# Patient Record
Sex: FEMALE | Race: WHITE
[De-identification: names, ages, dates, MRNs, and addresses within clinical notes are randomized per-mention and may not be internally consistent; named-entity substitution may affect disease eponyms.]

---

## 2010-03-16 LAB — MAMMOGRAPHY, EXTERNAL: NORMAL

## 2024-08-29 ENCOUNTER — OFFICE VISIT (OUTPATIENT)
Facility: CLINIC | Age: 59
End: 2024-08-29

## 2024-08-29 VITALS
BODY MASS INDEX: 40.01 KG/M2 | WEIGHT: 217.4 LBS | SYSTOLIC BLOOD PRESSURE: 181 MMHG | DIASTOLIC BLOOD PRESSURE: 86 MMHG | TEMPERATURE: 98.7 F | OXYGEN SATURATION: 95 % | HEIGHT: 62 IN | HEART RATE: 84 BPM | RESPIRATION RATE: 12 BRPM

## 2024-08-29 DIAGNOSIS — Z12.4 CERVICAL CANCER SCREENING: ICD-10-CM

## 2024-08-29 DIAGNOSIS — Z13.1 ENCOUNTER FOR SCREENING FOR DIABETES MELLITUS: ICD-10-CM

## 2024-08-29 DIAGNOSIS — Z12.12 ENCOUNTER FOR COLORECTAL CANCER SCREENING: ICD-10-CM

## 2024-08-29 DIAGNOSIS — E66.01 CLASS 2 SEVERE OBESITY DUE TO EXCESS CALORIES WITH SERIOUS COMORBIDITY AND BODY MASS INDEX (BMI) OF 39.0 TO 39.9 IN ADULT (HCC): ICD-10-CM

## 2024-08-29 DIAGNOSIS — Z76.89 ENCOUNTER TO ESTABLISH CARE WITH NEW DOCTOR: Primary | ICD-10-CM

## 2024-08-29 DIAGNOSIS — Z12.31 SCREENING MAMMOGRAM FOR BREAST CANCER: ICD-10-CM

## 2024-08-29 DIAGNOSIS — Z11.59 ENCOUNTER FOR HEPATITIS C SCREENING TEST FOR LOW RISK PATIENT: ICD-10-CM

## 2024-08-29 DIAGNOSIS — Z13.220 SCREENING CHOLESTEROL LEVEL: ICD-10-CM

## 2024-08-29 DIAGNOSIS — L03.115 CELLULITIS OF RIGHT LOWER EXTREMITY: ICD-10-CM

## 2024-08-29 DIAGNOSIS — Z13.6 ENCOUNTER FOR SCREENING FOR CORONARY ARTERY DISEASE: ICD-10-CM

## 2024-08-29 DIAGNOSIS — Z12.11 ENCOUNTER FOR COLORECTAL CANCER SCREENING: ICD-10-CM

## 2024-08-29 DIAGNOSIS — Z11.4 ENCOUNTER FOR SCREENING FOR HIV: ICD-10-CM

## 2024-08-29 DIAGNOSIS — Z76.89 ENCOUNTER TO ESTABLISH CARE WITH NEW DOCTOR: ICD-10-CM

## 2024-08-29 DIAGNOSIS — K43.9 VENTRAL HERNIA WITHOUT OBSTRUCTION OR GANGRENE: ICD-10-CM

## 2024-08-29 RX ORDER — FAMOTIDINE 10 MG
10 TABLET ORAL 2 TIMES DAILY
COMMUNITY

## 2024-08-29 RX ORDER — SULFAMETHOXAZOLE/TRIMETHOPRIM 800-160 MG
1 TABLET ORAL 2 TIMES DAILY
Qty: 20 TABLET | Refills: 0 | Status: SHIPPED | OUTPATIENT
Start: 2024-08-29 | End: 2024-09-08

## 2024-08-29 SDOH — ECONOMIC STABILITY: FOOD INSECURITY: WITHIN THE PAST 12 MONTHS, THE FOOD YOU BOUGHT JUST DIDN'T LAST AND YOU DIDN'T HAVE MONEY TO GET MORE.: NEVER TRUE

## 2024-08-29 SDOH — ECONOMIC STABILITY: FOOD INSECURITY: WITHIN THE PAST 12 MONTHS, YOU WORRIED THAT YOUR FOOD WOULD RUN OUT BEFORE YOU GOT MONEY TO BUY MORE.: NEVER TRUE

## 2024-08-29 SDOH — ECONOMIC STABILITY: INCOME INSECURITY: HOW HARD IS IT FOR YOU TO PAY FOR THE VERY BASICS LIKE FOOD, HOUSING, MEDICAL CARE, AND HEATING?: NOT HARD AT ALL

## 2024-08-29 ASSESSMENT — PATIENT HEALTH QUESTIONNAIRE - PHQ9
SUM OF ALL RESPONSES TO PHQ QUESTIONS 1-9: 1
2. FEELING DOWN, DEPRESSED OR HOPELESS: SEVERAL DAYS
SUM OF ALL RESPONSES TO PHQ QUESTIONS 1-9: 1
1. LITTLE INTEREST OR PLEASURE IN DOING THINGS: NOT AT ALL
SUM OF ALL RESPONSES TO PHQ9 QUESTIONS 1 & 2: 1

## 2024-08-29 NOTE — PROGRESS NOTES
Naya Borges is a 59 y.o. year old female who presents today for   Chief Complaint   Patient presents with    New Patient       Is someone accompanying this pt? No     Is the patient using any DME equipment during OV? No     Depression Screenin/29/2024    10:57 AM   PHQ-9 Questionaire   Little interest or pleasure in doing things 0   Feeling down, depressed, or hopeless 1   PHQ-9 Total Score 1       Abuse Screenin/29/2024    10:00 AM   AMB Abuse Screening   Do you ever feel afraid of your partner? N   Are you in a relationship with someone who physically or mentally threatens you? N   Is it safe for you to go home? Y       Learning Assessment:  Who is the primary learner? Patient    What is the preferred language for health care of the primary learner? ENGLISH    How does the primary learner prefer to learn new concepts? READING    Answered By patient    Relationship to Learner SELF    Highest level of education completed by primary learner? GRADUATED HIGH SCHOOL OR GED    Are there any barriers / factors that could impact learning? NONE    Will there be a co-learner / caregiver? No        Fall Risk:       No data to display                    Coordination of Care:   1. \"Have you been to the ER, urgent care clinic since your last visit?  Hospitalized since your last visit?\" No     2. \"Have you seen or consulted any other health care providers outside of the [facility] since your last visit?\" No     3. For patients aged 45-75: Has the patient had a colonoscopy / FIT/ Cologuard? Due     If the patient is female:    4. For patients aged 40-74: Has the patient had a mammogram within the past 2 years? Due     5. For patients aged 21-65: Has the patient had a pap smear? Due     Health Maintenance: reviewed and discussed and ordered per Provider.    Health Maintenance Due   Topic Date Due    Depression Screen  Never done    HIV screen  Never done    Hepatitis C screen  Never done     Cervical cancer screen  Never done    Lipids  Never done    Breast cancer screen  Never done    Colorectal Cancer Screen  Never done    Shingles vaccine (1 of 2) Never done    COVID-19 Vaccine (1 - 2023-24 season) Never done    Respiratory Syncytial Virus (RSV) Pregnant or age 60 yrs+ (1 - 1-dose 60+ series) Never done    Flu vaccine (1) Never done        -HARMONY Camilo  [facility]  Phone: [phone]  Fax: [phone]

## 2024-08-29 NOTE — PROGRESS NOTES
Pulse: 84   Resp: 12   Temp: 98.7 °F (37.1 °C)   TempSrc: Temporal   SpO2: 95%   Weight: 98.6 kg (217 lb 6.4 oz)   Height: 1.575 m (5' 2\")       Physical Exam      Assessment/Plan:    1. Encounter to establish care with new doctor  -     Comprehensive Metabolic Panel; Future  -     CBC; Future  -     Hemoglobin A1C; Future  -     Lipid Panel; Future  2. Encounter for screening for diabetes mellitus  -     Hemoglobin A1C; Future  3. Encounter for screening for coronary artery disease  -     Hemoglobin A1C; Future  -     Lipid Panel; Future  4. Screening cholesterol level  -     Lipid Panel; Future  5. Encounter for screening for HIV  -     HIV 1/2 Ag/Ab, 4TH Generation,W Rflx Confirm; Future  6. Encounter for hepatitis C screening test for low risk patient  -     Hepatitis C Ab, Rflx to Qt by PCR; Future  7. Class 2 severe obesity due to excess calories with serious comorbidity and body mass index (BMI) of 39.0 to 39.9 in adult (HCC)  8. Screening mammogram for breast cancer  -     BHUMI DIGITAL SCREEN W OR WO CAD BILATERAL; Future  9. Encounter for colorectal cancer screening  -     Cologuard (Fecal DNA Colorectal Cancer Screening)  10. Cervical cancer screening  -     Amb External Referral To Gynecology  11. Cellulitis of right lower extremity  -     sulfamethoxazole-trimethoprim (BACTRIM DS;SEPTRA DS) 800-160 MG per tablet; Take 1 tablet by mouth 2 times daily for 10 days, Disp-20 tablet, R-0Normal  12. Ventral hernia without obstruction or gangrene  -     [facility]      Follow-up and Dispositions    Return in about 2 weeks (around 9/12/2024) for lab results.         On this date 8/29/2024 I have spent 32 minutes reviewing previous notes, test results and face to face with the patient discussing the diagnosis and importance of compliance with the treatment plan as well as documenting on the day of the visit.    I have discussed the diagnosis with the

## 2024-08-29 NOTE — PATIENT INSTRUCTIONS
It was nice meeting you today.  Please have your labs done either immediately after this visit, or you can schedule at the  to come back for labs.  Please do your labs at least a week before your next appointment.    If you have questions or concerns, My Chart is the fastest way to get in touch with me and the clinical staff.    Keep in mind that [facility] policy schedules most appointments to last 15 minutes. If you have a new problem or multiple problems you can request 30 minutes.  If you arrive more than snf through your scheduled appointment, staff may offer to reschedule you. This is to ensure you and the provider have enough time to complete a high quality encounter.  Please arrive to your appointments at least 10 minutes early to avoid any unforseen delays.     If you have trouble paying medical bills, please consider contacting [facility].  [employer] patient representatives are available to discuss government programs that provide assistance with medical bills to qualifying individuals and their families.  The services are provided free of charge to patients in need of assistance.  [facility] patient representatives can also assist you in completing and registering applications with appropriate agencies.  Please call the following number if you are interested: [phone].

## 2024-08-30 LAB
ALBUMIN SERPL-MCNC: 4.1 G/DL (ref 3.8–4.9)
ALP SERPL-CCNC: 84 IU/L (ref 44–121)
ALT SERPL-CCNC: 11 IU/L (ref 0–32)
AST SERPL-CCNC: 14 IU/L (ref 0–40)
BILIRUB SERPL-MCNC: 0.8 MG/DL (ref 0–1.2)
BUN SERPL-MCNC: 14 MG/DL (ref 8–27)
BUN/CREAT SERPL: 16 (ref 12–28)
CALCIUM SERPL-MCNC: 9 MG/DL (ref 8.7–10.3)
CHLORIDE SERPL-SCNC: 103 MMOL/L (ref 96–106)
CHOLEST SERPL-MCNC: 193 MG/DL (ref 100–199)
CO2 SERPL-SCNC: 24 MMOL/L (ref 20–29)
CREAT SERPL-MCNC: 0.88 MG/DL (ref 0.57–1)
EGFRCR SERPLBLD CKD-EPI 2021: 75 ML/MIN/1.73
ERYTHROCYTE [DISTWIDTH] IN BLOOD BY AUTOMATED COUNT: 13.1 % (ref 11.7–15.4)
GLOBULIN SER CALC-MCNC: 3.2 G/DL (ref 1.5–4.5)
GLUCOSE SERPL-MCNC: 103 MG/DL (ref 70–99)
HBA1C MFR BLD: 5.7 % (ref 4.8–5.6)
HCT VFR BLD AUTO: 45.4 % (ref 34–46.6)
HCV AB SERPL QL IA: NORMAL
HCV IGG SERPL QL IA: NON REACTIVE
HDLC SERPL-MCNC: 32 MG/DL
HGB BLD-MCNC: 14.9 G/DL (ref 11.1–15.9)
HIV 1+2 AB+HIV1 P24 AG SERPL QL IA: NON REACTIVE
LDLC SERPL CALC-MCNC: 136 MG/DL (ref 0–99)
MCH RBC QN AUTO: 29.6 PG (ref 26.6–33)
MCHC RBC AUTO-ENTMCNC: 32.8 G/DL (ref 31.5–35.7)
MCV RBC AUTO: 90 FL (ref 79–97)
PLATELET # BLD AUTO: 222 X10E3/UL (ref 150–450)
POTASSIUM SERPL-SCNC: 3.7 MMOL/L (ref 3.5–5.2)
PROT SERPL-MCNC: 7.3 G/DL (ref 6–8.5)
RBC # BLD AUTO: 5.04 X10E6/UL (ref 3.77–5.28)
SODIUM SERPL-SCNC: 144 MMOL/L (ref 134–144)
TRIGL SERPL-MCNC: 140 MG/DL (ref 0–149)
VLDLC SERPL CALC-MCNC: 25 MG/DL (ref 5–40)
WBC # BLD AUTO: 9.7 X10E3/UL (ref 3.4–10.8)

## 2024-09-12 ENCOUNTER — OFFICE VISIT (OUTPATIENT)
Facility: CLINIC | Age: 59
End: 2024-09-12

## 2024-09-12 VITALS
WEIGHT: 217 LBS | HEIGHT: 62 IN | OXYGEN SATURATION: 96 % | DIASTOLIC BLOOD PRESSURE: 80 MMHG | HEART RATE: 69 BPM | RESPIRATION RATE: 10 BRPM | BODY MASS INDEX: 39.93 KG/M2 | TEMPERATURE: 98 F | SYSTOLIC BLOOD PRESSURE: 135 MMHG

## 2024-09-12 DIAGNOSIS — E78.00 HYPERCHOLESTEREMIA: ICD-10-CM

## 2024-09-12 DIAGNOSIS — R73.03 PREDIABETES: Primary | ICD-10-CM

## 2024-09-12 RX ORDER — METFORMIN HCL 500 MG
500 TABLET, EXTENDED RELEASE 24 HR ORAL
Qty: 90 TABLET | Refills: 1 | Status: SHIPPED | OUTPATIENT
Start: 2024-09-12

## 2024-10-15 ENCOUNTER — TELEPHONE (OUTPATIENT)
Facility: CLINIC | Age: 59
End: 2024-10-15

## 2024-10-15 NOTE — TELEPHONE ENCOUNTER
Pt's brother, Agusto Escobar, is requesting a c/b from PCP. Pt's brother feels his sister is needing to be evaluated for mental health reasons. Pt has been notified that they may be billed for the providers call.

## 2024-10-21 NOTE — TELEPHONE ENCOUNTER
Pt's brother, Agusto Escobar, is concerned for her mental condition: severe forgetfulness, poor money management, short/long term memory problems.  Pt lives with brother, and her ADLs are met.    Brother to make appt for her.

## 2024-10-29 ENCOUNTER — OFFICE VISIT (OUTPATIENT)
Facility: CLINIC | Age: 59
End: 2024-10-29

## 2024-10-29 VITALS
OXYGEN SATURATION: 97 % | WEIGHT: 212.6 LBS | HEART RATE: 76 BPM | RESPIRATION RATE: 15 BRPM | BODY MASS INDEX: 39.12 KG/M2 | SYSTOLIC BLOOD PRESSURE: 131 MMHG | TEMPERATURE: 97.7 F | DIASTOLIC BLOOD PRESSURE: 72 MMHG | HEIGHT: 62 IN

## 2024-10-29 DIAGNOSIS — E78.00 HYPERCHOLESTEREMIA: ICD-10-CM

## 2024-10-29 DIAGNOSIS — R41.9 COGNITIVE COMPLAINTS: Primary | ICD-10-CM

## 2024-10-29 NOTE — PROGRESS NOTES
Naya Borges is a 59 y.o. year old female who presents today for   Chief Complaint   Patient presents with    Other     Discuss cognitive condition        \"Have you been to the ER, urgent care clinic since your last visit?  Hospitalized since your last visit?\"   yes Where:[facility]     When:last month    Reason: chest pain     “Have you seen or consulted any other health care providers outside our system since your last visit?”   NO     Have you had a mammogram?”   NO    No breast cancer screening on file      “Have you had a pap smear?”    NO    No cervical cancer screening on file       “Have you had a colorectal cancer screening such as a colonoscopy/FIT/Cologuard?    NO  Cologuard at home  No colonoscopy on file  No cologuard on file  No FIT/FOBT on file   No flexible sigmoidoscopy on file           Amanda Munoz [facility]  [facility]  Ph: [phone]  Fax: [phone]

## 2024-10-29 NOTE — PROGRESS NOTES
Naya Borges (:  1965) is a 59 y.o. female here for evaluation of the following chief complaint(s):  Other (Discuss cognitive condition)        ASSESSMENT/PLAN:  1. Cognitive complaints  Assessment & Plan:  - minicog negative  - recommended pt write down tasks so she does not forget   2. Hypercholesteremia  Assessment & Plan:  - continue to improve diet       Follow-up and Dispositions    Return in about 4 months (around 2025) for annual; lipids and A1C.         SUBJECTIVE/OBJECTIVE:  HPI  Cognitive concern  - brother is here today with pt; concerned for poor short term memory and cognitive function  - pt admits to being forgetful but refutes it being a problem  - Minicog score was 4  - she has had at least one suspicious online relationship that resulted in lost money; currently has a new online relationship with a \"famous\" man. He told her he cannot meet her due to \"security\" concerns.  Family is concerned, but pt does not share the same skepticism    HLD  - working on diet and has lost 5 lbs    HM  - colo: plans to do cologuard  - mammo: plans to go to [place] for mammo    ROS as stated above.    /72 (Site: Left Upper Arm, Position: Sitting, Cuff Size: Large Adult)   Pulse 76   Temp 97.7 °F (36.5 °C) (Temporal)   Resp 15   Ht 1.575 m (5' 2\")   Wt 96.4 kg (212 lb 9.6 oz)   SpO2 97%   BMI 38.89 kg/m²     Physical Exam          An electronic signature was used to authenticate this note.    --Ari Garcia MD

## 2024-11-05 ENCOUNTER — OFFICE VISIT (OUTPATIENT)
Age: 59
End: 2024-11-05
Payer: MEDICAID

## 2024-11-05 VITALS
HEART RATE: 65 BPM | SYSTOLIC BLOOD PRESSURE: 141 MMHG | DIASTOLIC BLOOD PRESSURE: 68 MMHG | HEIGHT: 62 IN | TEMPERATURE: 98 F | WEIGHT: 213 LBS | OXYGEN SATURATION: 96 % | BODY MASS INDEX: 39.2 KG/M2

## 2024-11-05 DIAGNOSIS — E66.01 CLASS 2 SEVERE OBESITY DUE TO EXCESS CALORIES WITH SERIOUS COMORBIDITY AND BODY MASS INDEX (BMI) OF 39.0 TO 39.9 IN ADULT: ICD-10-CM

## 2024-11-05 DIAGNOSIS — K43.2 INCISIONAL HERNIA, WITHOUT OBSTRUCTION OR GANGRENE: Primary | ICD-10-CM

## 2024-11-05 DIAGNOSIS — G47.33 OSA (OBSTRUCTIVE SLEEP APNEA): ICD-10-CM

## 2024-11-05 DIAGNOSIS — E66.812 CLASS 2 SEVERE OBESITY DUE TO EXCESS CALORIES WITH SERIOUS COMORBIDITY AND BODY MASS INDEX (BMI) OF 39.0 TO 39.9 IN ADULT: ICD-10-CM

## 2024-11-05 PROCEDURE — 99204 OFFICE O/P NEW MOD 45 MIN: CPT | Performed by: SURGERY

## 2024-11-05 NOTE — PROGRESS NOTES
General Surgery Consult      Naya Borges  Admit date: (Not on file)    MRN: [id number]     : 1965     Age: 59 y.o.        Attending Physician: Alexander Jeffrey MD, [facility]      History of Present Illness:     Naya Borges is a 59 y.o. female who was referred to me by Dr. Garcia for evaluation of a large abdominal wall hernia.  The patient has multiple comorbidities mainly morbid obesity and diabetes and she stated that she had a surgery when she was young and she had developed this huge hernia .  The hernia is not causing any pain or discomfort and she had a CT scan that showed a multiple ventral hernias containing mesenteric fat and nonobstructed portion of bowel.     Patient Active Problem List    Diagnosis Date Noted    Cognitive complaints 10/29/2024    Prediabetes 2024    Hypercholesteremia 2024    Cellulitis of right lower extremity 2024    REX (obstructive sleep apnea)     Class 2 severe obesity due to excess calories with serious comorbidity and body mass index (BMI) of 39.0 to 39.9 in adult     Arthropathy      History reviewed. No pertinent past medical history.   No past surgical history on file.   Social History     Tobacco Use    Smoking status: Never    Smokeless tobacco: Never   Substance Use Topics    Alcohol use: Not Currently      Social History     Tobacco Use   Smoking Status Never   Smokeless Tobacco Never     No family history on file.   Current Outpatient Medications   Medication Sig    metFORMIN (GLUCOPHAGE-XR) 500 MG extended release tablet Take 1 tablet by mouth daily (with breakfast)    famotidine (PEPCID) 10 MG tablet Take 1 tablet by mouth 2 times daily     No current facility-administered medications for this visit.      Allergies   Allergen Reactions    Penicillins Anaphylaxis        Review of Systems:  Pertinent items are noted in the History of Present Illness.    Objective:     BP (!) 141/68 (Site: Right Upper Arm, Position: Sitting,

## 2024-11-05 NOTE — PROGRESS NOTES
Naya Borges is a 59 y.o. female (: 1965) presenting to address:    Chief Complaint   Patient presents with    New Patient     Ventral hernia and cholelithiasis/referred by Dr. Ari Garcia       Medication list and allergies have been reviewed with Naya JAYLA Jony and updated as of today's date.     I have gone over all Medical, Surgical and Social History with Naya Borges and updated/added the information accordingly.

## 2024-12-13 ENCOUNTER — COMMUNITY OUTREACH (OUTPATIENT)
Facility: CLINIC | Age: 59
End: 2024-12-13

## 2024-12-13 DIAGNOSIS — R73.03 PREDIABETES: ICD-10-CM

## 2024-12-13 NOTE — TELEPHONE ENCOUNTER
Medication(s) requesting:   Requested Prescriptions     Pending Prescriptions Disp Refills    metFORMIN (GLUCOPHAGE-XR) 500 MG extended release tablet 90 tablet 1     Sig: Take 1 tablet by mouth daily (with breakfast)        Last office visit:  10/29/2024  Next office visit DMA: Visit date not found

## 2024-12-16 RX ORDER — METFORMIN HYDROCHLORIDE 500 MG/1
500 TABLET, EXTENDED RELEASE ORAL
Qty: 60 TABLET | Refills: 0 | Status: SHIPPED | OUTPATIENT
Start: 2024-12-16

## 2024-12-26 ENCOUNTER — TELEPHONE (OUTPATIENT)
Facility: CLINIC | Age: 59
End: 2024-12-26

## 2024-12-26 NOTE — TELEPHONE ENCOUNTER
Called pt for cele schedule at [phone] as per message calling restriction unable to leave message

## 2025-01-10 DIAGNOSIS — R73.03 PREDIABETES: ICD-10-CM

## 2025-01-10 NOTE — TELEPHONE ENCOUNTER
[facility] requesting medication refill on the following:     Metformin (GLUCOPHAGE-XR) 500 MG extended release tablet     LOV: 10/29/24   NOV: 2/10/25     Please be advised, thank you.

## 2025-01-13 RX ORDER — METFORMIN HYDROCHLORIDE 500 MG/1
500 TABLET, EXTENDED RELEASE ORAL
Qty: 60 TABLET | Refills: 0 | Status: SHIPPED | OUTPATIENT
Start: 2025-01-13

## 2025-02-07 ENCOUNTER — OFFICE VISIT (OUTPATIENT)
Facility: CLINIC | Age: 60
End: 2025-02-07

## 2025-02-07 VITALS
RESPIRATION RATE: 14 BRPM | SYSTOLIC BLOOD PRESSURE: 137 MMHG | DIASTOLIC BLOOD PRESSURE: 83 MMHG | TEMPERATURE: 97.9 F | WEIGHT: 202.4 LBS | BODY MASS INDEX: 37.25 KG/M2 | OXYGEN SATURATION: 95 % | HEART RATE: 71 BPM | HEIGHT: 62 IN

## 2025-02-07 DIAGNOSIS — L03.115 CELLULITIS OF RIGHT LOWER EXTREMITY: ICD-10-CM

## 2025-02-07 DIAGNOSIS — R73.03 PREDIABETES: Primary | ICD-10-CM

## 2025-02-07 DIAGNOSIS — Z12.11 SCREENING FOR MALIGNANT NEOPLASM OF COLON: ICD-10-CM

## 2025-02-07 DIAGNOSIS — Z12.4 CERVICAL CANCER SCREENING: ICD-10-CM

## 2025-02-07 DIAGNOSIS — E78.00 HYPERCHOLESTEREMIA: ICD-10-CM

## 2025-02-07 DIAGNOSIS — Z12.9 CANCER SCREENING: ICD-10-CM

## 2025-02-07 LAB — HBA1C MFR BLD: 5 %

## 2025-02-07 RX ORDER — SULFAMETHOXAZOLE AND TRIMETHOPRIM 800; 160 MG/1; MG/1
1 TABLET ORAL 2 TIMES DAILY
Qty: 14 TABLET | Refills: 0 | Status: SHIPPED | OUTPATIENT
Start: 2025-02-07 | End: 2025-02-14

## 2025-02-07 SDOH — ECONOMIC STABILITY: FOOD INSECURITY: WITHIN THE PAST 12 MONTHS, YOU WORRIED THAT YOUR FOOD WOULD RUN OUT BEFORE YOU GOT MONEY TO BUY MORE.: NEVER TRUE

## 2025-02-07 SDOH — ECONOMIC STABILITY: FOOD INSECURITY: WITHIN THE PAST 12 MONTHS, THE FOOD YOU BOUGHT JUST DIDN'T LAST AND YOU DIDN'T HAVE MONEY TO GET MORE.: NEVER TRUE

## 2025-02-07 ASSESSMENT — PATIENT HEALTH QUESTIONNAIRE - PHQ9
2. FEELING DOWN, DEPRESSED OR HOPELESS: SEVERAL DAYS
SUM OF ALL RESPONSES TO PHQ QUESTIONS 1-9: 1
SUM OF ALL RESPONSES TO PHQ9 QUESTIONS 1 & 2: 1
1. LITTLE INTEREST OR PLEASURE IN DOING THINGS: NOT AT ALL
SUM OF ALL RESPONSES TO PHQ QUESTIONS 1-9: 1

## 2025-02-07 NOTE — PROGRESS NOTES
Naya Borges is a 59 y.o. year old female who presents today for No chief complaint on file.       \"Have you been to the ER, urgent care clinic since your last visit?  Hospitalized since your last visit?\"   no      “Have you seen or consulted any other health care providers outside our system since your last visit?”   NO     Have you had a mammogram?”   NO    Date of last Mammogram: 3/16/2010      “Have you had a pap smear?”    NO    No cervical cancer screening on file       “Have you had a colorectal cancer screening such as a colonoscopy/FIT/Cologuard?    NO    No colonoscopy on file  No cologuard on file  No FIT/FOBT on file   No flexible sigmoidoscopy on file           Amanda Munoz [facility]  [facility]  Ph: [phone]  Fax: [phone]

## 2025-02-07 NOTE — PATIENT INSTRUCTIONS
You should receive a call from the specialist in a few days. If you do not, please call this clinic for the specialist's phone number, so you can schedule yourself.  Dermatology  Infectious Disease  Gynecology    Prevnar 20 is the highly recommended vaccine that prevents pneuomococcal disease.

## 2025-02-07 NOTE — PROGRESS NOTES
Naya Borges (:  1965) is a 59 y.o. female here for evaluation of the following chief complaint(s):  No chief complaint on file.        ASSESSMENT/PLAN:  1. Prediabetes  Assessment & Plan:  - A1C 5.0  - congratulated pt on great improvment   Orders:  -     AMB POC HEMOGLOBIN A1C  2. Hypercholesteremia  Assessment & Plan:  - check lipids  - will contact pt if tx needs to be altered   Orders:  -     Lipid Panel; Future  3. Screening for malignant neoplasm of colon  -     Cologuard (Fecal DNA Colorectal Cancer Screening)  4. Cellulitis of right lower extremity  Assessment & Plan:  - third occurrence in 9 months  - could also be bullous pemphigoid that is complicated by cellulitis  - VSS  - empiric bactrim, CBC  - derma and ID referral; I made it clear to the pt that ABX will not fix the problem, she needs to see the specialists   Orders:  -     External Referral To Infectious Disease  -     External Referral To Dermatology  -     sulfamethoxazole-trimethoprim (BACTRIM DS;SEPTRA DS) 800-160 MG per tablet; Take 1 tablet by mouth 2 times daily for 7 days, Disp-14 tablet, R-0Normal  -     CBC with Auto Differential; Future  5. Cervical cancer screening  -     External Referral To Gynecology  6. Cancer screening  Assessment & Plan:  - still no cancer screening accomplished  - I reiterated the necessity for screening and provided clear instructions on how to accomplish them, pt conveyed understanding       Follow-up and Dispositions    Return in about 3 months (around 2025) for check up.         SUBJECTIVE/OBJECTIVE:  HPI  PreDM  - A1C 5.7  - accepts metformin  Update (25)  - A1C 5.0     HLD  - , HDL 32  - ASCVD risk 6.2%  Update (10/29/24)  - working on diet and has lost 5 lbs  Update (25)  - continuing healthy diet    Cellulitis  - recurrent, R lower leg  Update (24)  - bactrim was very effective  - recurrence is random from 6 month to over 12 months  Update (25)  -

## 2025-02-07 NOTE — ASSESSMENT & PLAN NOTE
- still no cancer screening accomplished  - I reiterated the necessity for screening and provided clear instructions on how to accomplish them, pt conveyed understanding

## 2025-02-07 NOTE — ASSESSMENT & PLAN NOTE
- third occurrence in 9 months  - could also be bullous pemphigoid that is complicated by cellulitis  - VSS  - empiric bactrim, CBC  - derma and ID referral; I made it clear to the pt that ABX will not fix the problem, she needs to see the specialists

## 2025-02-08 LAB
BASOPHILS # BLD AUTO: 0.1 X10E3/UL (ref 0–0.2)
BASOPHILS NFR BLD AUTO: 2 %
CHOLEST SERPL-MCNC: 185 MG/DL (ref 100–199)
EOSINOPHIL # BLD AUTO: 0.2 X10E3/UL (ref 0–0.4)
EOSINOPHIL NFR BLD AUTO: 2 %
ERYTHROCYTE [DISTWIDTH] IN BLOOD BY AUTOMATED COUNT: 12.7 % (ref 11.7–15.4)
HCT VFR BLD AUTO: 43.5 % (ref 34–46.6)
HDLC SERPL-MCNC: 31 MG/DL
HGB BLD-MCNC: 14.4 G/DL (ref 11.1–15.9)
IMM GRANULOCYTES # BLD AUTO: 0 X10E3/UL (ref 0–0.1)
IMM GRANULOCYTES NFR BLD AUTO: 0 %
LDLC SERPL CALC-MCNC: 127 MG/DL (ref 0–99)
LYMPHOCYTES # BLD AUTO: 1.3 X10E3/UL (ref 0.7–3.1)
LYMPHOCYTES NFR BLD AUTO: 16 %
MCH RBC QN AUTO: 29.5 PG (ref 26.6–33)
MCHC RBC AUTO-ENTMCNC: 33.1 G/DL (ref 31.5–35.7)
MCV RBC AUTO: 89 FL (ref 79–97)
MONOCYTES # BLD AUTO: 0.5 X10E3/UL (ref 0.1–0.9)
MONOCYTES NFR BLD AUTO: 6 %
NEUTROPHILS # BLD AUTO: 5.7 X10E3/UL (ref 1.4–7)
NEUTROPHILS NFR BLD AUTO: 74 %
PLATELET # BLD AUTO: 210 X10E3/UL (ref 150–450)
RBC # BLD AUTO: 4.88 X10E6/UL (ref 3.77–5.28)
TRIGL SERPL-MCNC: 147 MG/DL (ref 0–149)
VLDLC SERPL CALC-MCNC: 27 MG/DL (ref 5–40)
WBC # BLD AUTO: 7.7 X10E3/UL (ref 3.4–10.8)

## 2025-03-09 PROBLEM — Z12.9 CANCER SCREENING: Status: RESOLVED | Noted: 2025-02-07 | Resolved: 2025-03-09

## 2025-05-16 ENCOUNTER — TELEPHONE (OUTPATIENT)
Facility: CLINIC | Age: 60
End: 2025-05-16

## 2025-05-16 DIAGNOSIS — E66.812 CLASS 2 SEVERE OBESITY DUE TO EXCESS CALORIES WITH SERIOUS COMORBIDITY AND BODY MASS INDEX (BMI) OF 39.0 TO 39.9 IN ADULT (HCC): ICD-10-CM

## 2025-05-16 DIAGNOSIS — R73.03 PREDIABETES: ICD-10-CM

## 2025-05-16 DIAGNOSIS — E78.00 HYPERCHOLESTEREMIA: ICD-10-CM

## 2025-05-16 DIAGNOSIS — R41.9 COGNITIVE COMPLAINTS: ICD-10-CM

## 2025-05-16 DIAGNOSIS — Z13.220 SCREENING FOR HYPERCHOLESTEROLEMIA: Primary | ICD-10-CM

## 2025-05-16 DIAGNOSIS — Z13.1 SCREENING FOR DIABETES MELLITUS: ICD-10-CM

## 2025-05-16 DIAGNOSIS — E66.01 CLASS 2 SEVERE OBESITY DUE TO EXCESS CALORIES WITH SERIOUS COMORBIDITY AND BODY MASS INDEX (BMI) OF 39.0 TO 39.9 IN ADULT (HCC): ICD-10-CM

## 2025-05-16 NOTE — TELEPHONE ENCOUNTER
Reached out to patient to schedule lab and follow up on both lines   [phone] /mail box is full cannot accept messages   .  [phone] advised to leave a message but line disconnects immediately      Thank you

## 2025-07-31 ENCOUNTER — TELEPHONE (OUTPATIENT)
Facility: CLINIC | Age: 60
End: 2025-07-31

## 2025-07-31 NOTE — TELEPHONE ENCOUNTER
I called patient's emergency contact and was able to touch base with pt and update her contact information along with sending her the link to activate mychart.  I stressed the importance of making an appointment with Dr. Garcia since it has been since February 2025. She states she relies on transportation from her brother and he recently had surgery.  She stated she will call back for an appointment for September or October.